# Patient Record
Sex: FEMALE | Race: WHITE | NOT HISPANIC OR LATINO | Employment: OTHER | URBAN - METROPOLITAN AREA
[De-identification: names, ages, dates, MRNs, and addresses within clinical notes are randomized per-mention and may not be internally consistent; named-entity substitution may affect disease eponyms.]

---

## 2022-02-21 ENCOUNTER — NEW PATIENT (OUTPATIENT)
Dept: URBAN - METROPOLITAN AREA CLINIC 26 | Facility: CLINIC | Age: 77
End: 2022-02-21

## 2022-02-21 VITALS
HEIGHT: 62 IN | HEART RATE: 79 BPM | SYSTOLIC BLOOD PRESSURE: 120 MMHG | BODY MASS INDEX: 20.43 KG/M2 | WEIGHT: 111 LBS | DIASTOLIC BLOOD PRESSURE: 86 MMHG

## 2022-02-21 DIAGNOSIS — H35.81: ICD-10-CM

## 2022-02-21 DIAGNOSIS — H35.363: ICD-10-CM

## 2022-02-21 DIAGNOSIS — H25.89: ICD-10-CM

## 2022-02-21 DIAGNOSIS — H35.373: ICD-10-CM

## 2022-02-21 DIAGNOSIS — H53.8: ICD-10-CM

## 2022-02-21 DIAGNOSIS — H04.123: ICD-10-CM

## 2022-02-21 PROCEDURE — 92134 CPTRZ OPH DX IMG PST SGM RTA: CPT

## 2022-02-21 PROCEDURE — 99204 OFFICE O/P NEW MOD 45 MIN: CPT

## 2022-02-21 ASSESSMENT — TONOMETRY
OS_IOP_MMHG: 19
OD_IOP_MMHG: 16

## 2022-02-21 ASSESSMENT — VISUAL ACUITY
OD_SC: 20/30-1
OS_SC: 20/40+2

## 2022-02-21 NOTE — PATIENT DISCUSSION
Recommended OBSERVATION and MONITORING for progression. DISCUSSED REGARDING INCREASED RISK OF COMPLICATIONS  WITH CATARACT SURGERY HAVING A HARDER NUCLEUS.

## 2022-02-21 NOTE — PATIENT DISCUSSION
DISCUSSED THE DX, IMAGES, PROGNOSIS AND TX PLAN WITH PT AND  AS INDPEENDENT HISTORIAN.  ALL QUESTIONS WERE ANSWERED.

## 2022-12-14 ENCOUNTER — OFFICE VISIT (OUTPATIENT)
Dept: DERMATOLOGY | Age: 77
End: 2022-12-14

## 2022-12-14 DIAGNOSIS — Z12.83 SCREENING FOR MALIGNANT NEOPLASM OF SKIN: ICD-10-CM

## 2022-12-14 DIAGNOSIS — L82.1 SEBORRHEIC KERATOSIS: Primary | ICD-10-CM

## 2022-12-14 DIAGNOSIS — D69.2 SOLAR PURPURA (CMD): ICD-10-CM

## 2022-12-14 PROCEDURE — 99203 OFFICE O/P NEW LOW 30 MIN: CPT | Performed by: DERMATOLOGY

## 2022-12-14 RX ORDER — SIMVASTATIN 20 MG
20 TABLET ORAL DAILY
COMMUNITY

## 2022-12-14 RX ORDER — RISEDRONATE SODIUM 35 MG/1
35 TABLET, FILM COATED ORAL
COMMUNITY

## 2022-12-14 RX ORDER — LANOLIN ALCOHOL/MO/W.PET/CERES
CREAM (GRAM) TOPICAL DAILY
COMMUNITY